# Patient Record
Sex: FEMALE | Race: WHITE
[De-identification: names, ages, dates, MRNs, and addresses within clinical notes are randomized per-mention and may not be internally consistent; named-entity substitution may affect disease eponyms.]

---
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
## 2019-07-17 ENCOUNTER — HOSPITAL ENCOUNTER (EMERGENCY)
Dept: HOSPITAL 25 - UCCORT | Age: 19
Discharge: HOME | End: 2019-07-17
Payer: COMMERCIAL

## 2019-07-17 VITALS — SYSTOLIC BLOOD PRESSURE: 99 MMHG | DIASTOLIC BLOOD PRESSURE: 65 MMHG

## 2019-07-17 DIAGNOSIS — J03.90: Primary | ICD-10-CM

## 2019-07-17 PROCEDURE — 99211 OFF/OP EST MAY X REQ PHY/QHP: CPT

## 2019-07-17 PROCEDURE — G0463 HOSPITAL OUTPT CLINIC VISIT: HCPCS

## 2019-07-17 PROCEDURE — 87651 STREP A DNA AMP PROBE: CPT

## 2019-07-17 NOTE — UC
General HPI





- HPI Summary


HPI Summary: 





diarrhea sunday x3, monday x2. once yesterday and none today.


past 2 days pt c/o a sore throat and white spots on R tonsil.


no fever, cough, sob, abdominal pain, n/v.





- History of Current Complaint


Chief Complaint: UCGeneralIllness


Stated Complaint: SORE THROAT/SPOTS


Time Seen by Provider: 07/17/19 18:00


Hx Obtained From: Patient


Hx Last Menstrual Period: 6/26/19


Pain Intensity: 0





- Allergy/Home Medications


Allergies/Adverse Reactions: 


 Allergies











Allergy/AdvReac Type Severity Reaction Status Date / Time


 


No Known Allergies Allergy   Verified 07/17/19 18:04











Home Medications: 


 Home Medications





Norgestimate-Ethinyl Estradiol [Tri-Linyah Tablet] 1 tab PO DAILY 07/17/19 [

History Confirmed 07/17/19]











PMH/Surg Hx/FS Hx/Imm Hx


Previously Healthy: Yes





- Surgical History


Surgical History: None





- Family History


Known Family History: Positive: Diabetes, Other - postivie h for eye 

irritation


   Negative: Cardiac Disease, Hypertension





- Social History


Lives: With Family


Alcohol Use: None


Substance Use Type: None


Smoking Status (MU): Never Smoked Tobacco


Have You Smoked in the Last Year: No





- Immunization History


Vaccination Up to Date: Yes





Review of Systems


All Other Systems Reviewed And Are Negative: Yes


Constitutional: Negative: Fever, Chills


ENT: Positive: Sore Throat.  Negative: Nasal Discharge, Sinus Congestion, Sinus 

Pain/Tenderness


Respiratory: Negative: Shortness Of Breath, Cough


Cardiovascular: Negative: Chest Pain


Gastrointestinal: Positive: Diarrhea - has resolved.  Negative: Abdominal Pain, 

Vomiting, Nausea





Physical Exam


Triage Information Reviewed: Yes


Appearance: Well-Appearing


Vital Signs: 


 Initial Vital Signs











Temp  98.6 F   07/17/19 17:56


 


Pulse  71   07/17/19 17:56


 


Resp  16   07/17/19 17:56


 


BP  99/65   07/17/19 17:56


 


Pulse Ox  100   07/17/19 17:56











Eye Exam: Normal


ENT: Positive: Pharyngeal erythema - AND MILD TONSILLAR ERYTHEMA, TMs normal, 

Uvula midline.  Negative: Nasal congestion, Nasal drainage, Tonsillar exudate, 

Trismus, Muffled voice, Hoarse voice


Neck: Positive: Supple, Nontender, Enlarged Nodes @ - PERITONSILAR


Respiratory: Positive: No respiratory distress


Abdomen Description: Positive: Nontender, No Organomegaly, Soft


Musculoskeletal: Positive: ROM Intact


Neurological: Positive: Alert


Psychological: Positive: Age Appropriate Behavior


Skin Exam: Normal


Skin: Negative: Rashes





Diagnostics





- Laboratory


Lab Results: 





RAPID STREP=NEGATIVE





Course/Dx





- Differential Dx - Multi-Symptom


Differential Diagnoses: Other - RAPID STREP=NEGATIVE. ANTIBIOTICS NOT INDICATED.





- Diagnoses


Provider Diagnosis: 


 Tonsillitis








Discharge





- Sign-Out/Discharge


Documenting (check all that apply): Patient Departure


All imaging exams completed and their final reports reviewed: No Studies





- Discharge Plan


Condition: Stable


Disposition: HOME


Patient Education Materials:  Tonsillitis (ED)


Referrals: 


Shailesh Shultz MD [Medical Doctor] - 


Additional Instructions: 


FOLLOW UP IF NOT BETTER IN5-7 DAYS OR SOONER IF WORSE.





- Billing Disposition and Condition


Condition: STABLE


Disposition: Home

## 2019-09-26 ENCOUNTER — HOSPITAL ENCOUNTER (EMERGENCY)
Dept: HOSPITAL 25 - UCCORT | Age: 19
Discharge: HOME | End: 2019-09-26
Payer: COMMERCIAL

## 2019-09-26 VITALS — SYSTOLIC BLOOD PRESSURE: 114 MMHG | DIASTOLIC BLOOD PRESSURE: 63 MMHG

## 2019-09-26 DIAGNOSIS — R51: Primary | ICD-10-CM

## 2019-09-26 DIAGNOSIS — R42: ICD-10-CM

## 2019-09-26 PROCEDURE — 99211 OFF/OP EST MAY X REQ PHY/QHP: CPT

## 2019-09-26 PROCEDURE — G0463 HOSPITAL OUTPT CLINIC VISIT: HCPCS
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
## 2019-10-17 ENCOUNTER — HOSPITAL ENCOUNTER (EMERGENCY)
Dept: HOSPITAL 25 - UCCORT | Age: 19
Discharge: HOME | End: 2019-10-17
Payer: COMMERCIAL

## 2019-10-17 VITALS — DIASTOLIC BLOOD PRESSURE: 71 MMHG | SYSTOLIC BLOOD PRESSURE: 110 MMHG

## 2019-10-17 DIAGNOSIS — N10: Primary | ICD-10-CM

## 2019-10-17 PROCEDURE — 84702 CHORIONIC GONADOTROPIN TEST: CPT

## 2019-10-17 PROCEDURE — 81003 URINALYSIS AUTO W/O SCOPE: CPT

## 2019-10-17 PROCEDURE — 87086 URINE CULTURE/COLONY COUNT: CPT

## 2019-10-17 PROCEDURE — 99212 OFFICE O/P EST SF 10 MIN: CPT

## 2019-10-17 PROCEDURE — G0463 HOSPITAL OUTPT CLINIC VISIT: HCPCS

## 2019-10-17 PROCEDURE — 87077 CULTURE AEROBIC IDENTIFY: CPT

## 2019-10-17 NOTE — ED
Abdominal Pain/Female





- HPI Summary


HPI Summary: 





19 yr old female with the complaint of right flank pain, and right upper 

quadrant pain.  No fever or chills. No NVD.  No loss of appetite.  Pain not 

affected by eating.  No vaginal bleeding or discharge.  Pain is 5/10.   





- History of Current Complaint


Chief Complaint: UCAbdominalPain


Stated Complaint: LOWER ABD PAIN


Time Seen by Provider: 10/17/19 09:02


Hx Last Menstrual Period: 9/30/19


Pain Intensity: 6


Allergies/Adverse Reactions: 


 Allergies











Allergy/AdvReac Type Severity Reaction Status Date / Time


 


No Known Allergies Allergy   Verified 10/17/19 08:57














PMH/Surg Hx/FS Hx/Imm Hx


Endocrine/Hematology History: 


   Denies: Hx Thyroid Disease


Respiratory History: 


   Denies: Hx Asthma


Infectious Disease History: No


Infectious Disease History: 


   Denies: History Other Infectious Disease, Traveled Outside the  in Last 30 

Days





- Family History


Known Family History: Positive: Diabetes, Other - postivie h for eye 

irritation


   Negative: Cardiac Disease, Hypertension





- Social History


Occupation: Employed Full-time


Alcohol Use: Occasionally


Substance Use Type: Reports: None


Smoking Status (MU): Never Smoked Tobacco


Have You Smoked in the Last Year: No





Review of Systems


Constitutional: Negative


Positive: flank pain


All Other Systems Reviewed And Are Negative: Yes





Physical Exam


Triage Information Reviewed: Yes


Vital Signs On Initial Exam: 


 Initial Vitals











Temp Pulse Resp BP Pulse Ox


 


 99.4 F   87   16   110/71   98 


 


 10/17/19 08:52  10/17/19 08:52  10/17/19 08:52  10/17/19 08:52  10/17/19 08:52











Vital Signs Reviewed: Yes


Appearance: Positive: Well-Appearing, No Pain Distress


Skin: Positive: Warm, Skin Color Reflects Adequate Perfusion


Head/Face: Positive: Normal Head/Face Inspection


Eyes: Positive: EOMI


ENT: Positive: Normal ENT inspection


Neck: Positive: Nontender


Respiratory/Lung Sounds: Positive: Clear to Auscultation, Breath Sounds Present


Cardiovascular: Positive: RRR.  Negative: Murmur


Abdomen Description: Positive: Nontender, CVA Tenderness (R).  Negative: 

Distended


Musculoskeletal: Positive: Strength/ROM Intact


Neurological: Positive: Sensory/Motor Intact, Alert, Oriented to Person Place, 

Time, CN Intact II-III, Normal Gait, Speech Normal


Psychiatric: Positive: Normal





Diagnostics





- Vital Signs


 Vital Signs











  Temp Pulse Resp BP Pulse Ox


 


 10/17/19 08:52  99.4 F  87  16  110/71  98














- Laboratory


Lab Results: 


 Lab Results











  10/17/19 Range/Units





  09:59 


 


POC Urine Color  Yellow  


 


POC Urine Clarity  Cloudy  


 


POC Urine pH  7.0  (5-9)  


 


POC Ur Specif Gravity  1.015  (1.010-1.030)  


 


POC Urine Protein  1+ A  (Negative)  


 


POC Ur Glucose (UA)  Negative  (Negative)  


 


POC Urine Ketones  Negative  (Negative)  


 


POC Urine Blood  3+ A  (Negative)  


 


POC Urine Nitrite  Negative  (Negative)  


 


POC Urine Bilirubin  Negative  (Negative)  


 


POC Urine Urobilinogen  0.2  (Negative)  


 


POC U Leukocyte Esteras  3+ A  (Negative)  











Lab Statement: Any lab studies that have been ordered have been reviewed, and 

results considered in the medical decision making process.





Abdominal Pain Fem Course/Dx





- Course


Course Of Treatment: 19 yr old with pyelonephritis.  Rx with bactrim.  DChome





- Diagnoses


Provider Diagnoses: 


 Pyelonephritis








Discharge ED





- Sign-Out/Discharge


Documenting (check all that apply): Patient Departure


All imaging exams completed and their final reports reviewed: No Studies





- Discharge Plan


Condition: Good


Disposition: HOME


Prescriptions: 


Sulfamethox/Trimethoprim DS* [Bactrim /160 TAB*] 1 tab PO BID #20 tab


Patient Education Materials:  Kidney Infection (ED), Flank Pain (ED)


Referrals: 


KATE León PA [Primary Care Provider] - 2 Days





- Billing Disposition and Condition


Condition: GOOD


Disposition: Home

## 2019-12-04 ENCOUNTER — HOSPITAL ENCOUNTER (EMERGENCY)
Dept: HOSPITAL 25 - UCCORT | Age: 19
Discharge: HOME | End: 2019-12-04
Payer: COMMERCIAL

## 2019-12-04 VITALS — DIASTOLIC BLOOD PRESSURE: 63 MMHG | SYSTOLIC BLOOD PRESSURE: 108 MMHG

## 2019-12-04 DIAGNOSIS — J03.90: Primary | ICD-10-CM

## 2019-12-04 PROCEDURE — 87070 CULTURE OTHR SPECIMN AEROBIC: CPT

## 2019-12-04 PROCEDURE — 99212 OFFICE O/P EST SF 10 MIN: CPT

## 2019-12-04 PROCEDURE — 87651 STREP A DNA AMP PROBE: CPT

## 2019-12-04 PROCEDURE — G0463 HOSPITAL OUTPT CLINIC VISIT: HCPCS

## 2019-12-04 PROCEDURE — 87077 CULTURE AEROBIC IDENTIFY: CPT

## 2019-12-04 NOTE — UC
Throat Pain/Nasal Mike HPI





- HPI Summary


HPI Summary: 





20 yo female with sore throat and fever/chills x 2 days


hurts to swallow








- History of Current Complaint


Chief Complaint: UCGeneralIllness


Stated Complaint: SORE THROAT


Time Seen by Provider: 12/04/19 10:59


Hx Obtained From: Patient


Hx Last Menstrual Period: 11/12/19


Onset/Duration: Gradual Onset, Lasting Days


Severity: Moderate


Pain Intensity: 5


Pain Scale Used: 0-10 Numeric


Cough: None


Associated Signs & Symptoms: Positive: Fever





- Epiglottits Risk Factors


Epiglottis Risk Factors: Negative





- Allergies/Home Medications


Allergies/Adverse Reactions: 


 Allergies











Allergy/AdvReac Type Severity Reaction Status Date / Time


 


No Known Allergies Allergy   Verified 12/04/19 10:54











Home Medications: 


 Home Medications





Escitalopram Oxalate [Lexapro 10 mg] 1 tab PO DAILY 12/04/19 [History Confirmed 

12/04/19]











PMH/Surg Hx/FS Hx/Imm Hx


Previously Healthy: Yes





- Surgical History


Surgical History: None





- Family History


Known Family History: Positive: Diabetes, Other - postivie FMh for eye 

irritation


   Negative: Cardiac Disease, Hypertension





- Social History


Alcohol Use: Occasionally


Substance Use Type: None


Smoking Status (MU): Never Smoked Tobacco


Have You Smoked in the Last Year: No





- Immunization History


Vaccination Up to Date: Yes





Review of Systems


All Other Systems Reviewed And Are Negative: Yes


Constitutional: Positive: Fever, Chills


Skin: Positive: Negative


Eyes: Positive: Negative


ENT: Positive: Sore Throat


Respiratory: Positive: Negative


Cardiovascular: Positive: Negative


Gastrointestinal: Positive: Negative


Genitourinary: Positive: Negative


Motor: Positive: Negative


Neurovascular: Positive: Negative


Musculoskeletal: Positive: Negative


Neurological: Positive: Negative


Psychological: Positive: Negative





Physical Exam


Triage Information Reviewed: Yes


Appearance: Well-Appearing, No Pain Distress, Well-Nourished


Vital Signs: 


 Initial Vital Signs











Temp  100 F   12/04/19 10:55


 


Pulse  113   12/04/19 10:55


 


Resp  16   12/04/19 10:55


 


BP  108/63   12/04/19 10:55


 


Pulse Ox  100   12/04/19 10:55











Vital Signs Reviewed: Yes


Eyes: Positive: Conjunctiva Clear


ENT: Positive: Hearing grossly normal, Tonsillar swelling, Tonsillar exudate, 

Uvula midline.  Negative: Nasal congestion, Nasal drainage, Trismus, Muffled 

voice, Hoarse voice


Neck: Positive: Supple, Nontender, Enlarged Nodes @ - ant cerv


Respiratory: Positive: Lungs clear, Normal breath sounds, No respiratory 

distress, No accessory muscle use


Cardiovascular: Positive: RRR, No Murmur


Musculoskeletal: Positive: ROM Intact, No Edema


Neurological: Positive: Alert


Psychological Exam: Normal


Skin Exam: Normal





Throat Pain/Nasal Course/Dx





- Differential Dx/Diagnosis


Provider Diagnosis: 


 Tonsillitis








Discharge ED





- Sign-Out/Discharge


Documenting (check all that apply): Patient Departure


All imaging exams completed and their final reports reviewed: No Studies





- Discharge Plan


Condition: Stable


Disposition: HOME


Prescriptions: 


Cephalexin CAP* [Keflex CAP*] 500 mg PO BID #20 cap


predniSONE [Prednisone 20 MG TAB] 60 mg PO DAILY #9 tab


predniSONE [Prednisone 20 MG TAB] 60 mg PO DAILY #9 tab


Patient Education Materials:  Tonsillitis (ED)


Forms:  *Work Release


Referrals: 


KATE León PA [Primary Care Provider] - If Needed





- Billing Disposition and Condition


Condition: STABLE


Disposition: Home

## 2019-12-06 NOTE — UC
- Progress Note


Progress Note: 





+ Group C strep 


On cephalexin


no change


ljj 12/6/19





Course/Dx





- Diagnoses


Provider Diagnoses: 


 Tonsillitis








Discharge ED





- Sign-Out/Discharge


Documenting (check all that apply): Post-Discharge Follow Up


All imaging exams completed and their final reports reviewed: No Studies





- Discharge Plan


Condition: Stable


Disposition: HOME


Prescriptions: 


Cephalexin CAP* [Keflex CAP*] 500 mg PO BID #20 cap


predniSONE [Prednisone 20 MG TAB] 60 mg PO DAILY #9 tab


predniSONE [Prednisone 20 MG TAB] 60 mg PO DAILY #9 tab


Patient Education Materials:  Tonsillitis (ED)


Forms:  *Work Release


Referrals: 


KATE León PA [Primary Care Provider] - If Needed





- Billing Disposition and Condition


Condition: STABLE


Disposition: Home

## 2020-01-16 ENCOUNTER — HOSPITAL ENCOUNTER (EMERGENCY)
Dept: HOSPITAL 25 - UCCORT | Age: 20
Discharge: HOME | End: 2020-01-16
Payer: COMMERCIAL

## 2020-01-16 VITALS — DIASTOLIC BLOOD PRESSURE: 76 MMHG | SYSTOLIC BLOOD PRESSURE: 123 MMHG

## 2020-01-16 DIAGNOSIS — R10.9: ICD-10-CM

## 2020-01-16 DIAGNOSIS — R19.7: Primary | ICD-10-CM

## 2020-01-16 PROCEDURE — 87328 CRYPTOSPORIDIUM AG IA: CPT

## 2020-01-16 PROCEDURE — 87046 STOOL CULTR AEROBIC BACT EA: CPT

## 2020-01-16 PROCEDURE — 87493 C DIFF AMPLIFIED PROBE: CPT

## 2020-01-16 PROCEDURE — G0463 HOSPITAL OUTPT CLINIC VISIT: HCPCS

## 2020-01-16 PROCEDURE — 87045 FECES CULTURE AEROBIC BACT: CPT

## 2020-01-16 PROCEDURE — 99211 OFF/OP EST MAY X REQ PHY/QHP: CPT

## 2020-01-16 PROCEDURE — 87329 GIARDIA AG IA: CPT

## 2020-01-16 PROCEDURE — 87899 AGENT NOS ASSAY W/OPTIC: CPT

## 2020-01-16 NOTE — UC
Abdominal Pain Female HPI





- HPI Summary


HPI Summary: 





20 yo female with `1 week hx of diarrhea


10+x/d


no n/v


? low grade temp


NO HA


no myalgias





was on keflex last month











- History of Current Complaint


Chief Complaint: UCGU


Stated Complaint: ABDOMINAL PAIN/DIARRHEA/ACHY


Time Seen by Provider: 01/16/20 18:02


Hx Obtained From: Patient


Hx Last Menstrual Period: 1/12/20


Onset/Duration: Gradual Onset, Lasting Days


Severity Initially: Moderate


Severity Currently: Moderate


Pain Intensity: 7 - when she has cramps/she is currently pain free


Pain Scale Used: 0-10 Numeric


Location: Diffuse


Character: Cramping


Aggravating Factor(s): Food


Alleviating Factor(s): Nothing


Associated Signs and Symptoms: Positive: Diarrhea.  Negative: Diaphoresis, Cough

, Chest Pain, Dizzy, Back Pain, Constipation, Blood in Stool, Urinary Symptoms, 

Decreased Appetite, Vaginal Bleeding, Vaginal Discharge, Nausea, Vomiting


Allergies/Adverse Reactions: 


 Allergies











Allergy/AdvReac Type Severity Reaction Status Date / Time


 


No Known Allergies Allergy   Verified 01/16/20 17:59














PMH/Surg Hx/FS Hx/Imm Hx


Previously Healthy: Yes





- Surgical History


Surgical History: None





- Family History


Known Family History: Positive: Diabetes, Other - postivie FMh for eye 

irritation


   Negative: Cardiac Disease, Hypertension





- Social History


Alcohol Use: Occasionally


Substance Use Type: None


Smoking Status (MU): Never Smoked Tobacco


Have You Smoked in the Last Year: No





- Immunization History


Vaccination Up to Date: Yes





Review of Systems


All Other Systems Reviewed And Are Negative: Yes


Constitutional: Positive: Negative


Skin: Positive: Negative


Eyes: Positive: Negative


ENT: Positive: Negative


Respiratory: Positive: Negative


Cardiovascular: Positive: Negative


Gastrointestinal: Positive: Abdominal Pain - intermittent/generalized/crampy, 

Diarrhea


Genitourinary: Positive: Negative


Motor: Positive: Negative


Neurovascular: Positive: Negative


Musculoskeletal: Positive: Negative


Neurological: Positive: Negative


Psychological: Positive: Negative





Physical Exam


Triage Information Reviewed: Yes


Appearance: Well-Appearing, No Pain Distress, Well-Nourished


Vital Signs: 


 Initial Vital Signs











Temp  99 F   01/16/20 17:49


 


Pulse  94   01/16/20 17:49


 


Resp  20   01/16/20 17:49


 


BP  123/76   01/16/20 17:49


 


Pulse Ox  98   01/16/20 17:49











Vital Signs Reviewed: Yes


Eyes: Positive: Conjunctiva Clear


ENT: Positive: Hearing grossly normal, Pharynx normal, Uvula midline.  Negative

: Nasal congestion, Nasal drainage, Trismus, Hoarse voice


Dental Exam: Normal


Neck: Positive: Supple, Nontender, No Lymphadenopathy


Respiratory: Positive: Lungs clear, Normal breath sounds, No respiratory 

distress, No accessory muscle use


Cardiovascular: Positive: RRR, No Murmur


Abdomen Description: Positive: Nontender, No Organomegaly, Soft.  Negative: CVA 

Tenderness (R), CVA Tenderness (L)


Bowel Sounds: Positive: Present


Musculoskeletal: Positive: ROM Intact, No Edema


Neurological: Positive: Alert


Psychological Exam: Normal


Skin Exam: Normal





Abd Pain Female Course/Dx





- Course


Course Of Treatment: 





unable to provide specimen here





- Differential Dx/Diagnosis


Provider Diagnosis: 


 Acute diarrhea








Discharge ED





- Sign-Out/Discharge


Documenting (check all that apply): Patient Departure


All imaging exams completed and their final reports reviewed: No Studies





- Discharge Plan


Condition: Stable


Disposition: HOME


Patient Education Materials:  Acute Diarrhea (ED), Nutrition Tips for Relief of 

Diarrhea (ED)


Referrals: 


KATE León PA [Primary Care Provider] - 4 Days


Additional Instructions: 


Bring in stool for studies





to ER for new or worsening symptoms











- Billing Disposition and Condition


Condition: STABLE


Disposition: Home

## 2020-01-19 NOTE — UC
- Progress Note


Progress Note: 


Stool studies reviewed


Patient is seen for diarrhea.


C. difficile PCR: Negative


Stool culture, Shiga toxin and ova and parasite: Pending


Await final culture studies


No change in plan








Course/Dx





- Diagnoses


Provider Diagnoses: 


 Acute diarrhea








Discharge ED





- Sign-Out/Discharge


Documenting (check all that apply): Post-Discharge Follow Up


All imaging exams completed and their final reports reviewed: No Studies





- Discharge Plan


Condition: Stable


Disposition: HOME


Patient Education Materials:  Acute Diarrhea (ED), Nutrition Tips for Relief of 

Diarrhea (ED)


Referrals: 


KATE León PA [Primary Care Provider] - 4 Days


Additional Instructions: 


Bring in stool for studies





to ER for new or worsening symptoms











- Billing Disposition and Condition


Condition: STABLE


Disposition: Home

## 2020-01-20 NOTE — UC
- Progress Note


Progress Note: 


Stool O and P from January 17, 2020 comes back as positive for Giardia negative 

for cryptosporidium.





Patient to call patient's and have them start Flagyl 500 mg by mouth twice a 

day for 7 days.





Follow-up with primary care physician.  Reevaluation sooner if worse or 

questions or concerns.





Course/Dx





- Diagnoses


Provider Diagnoses: 


 Acute diarrhea








Discharge ED





- Sign-Out/Discharge


Documenting (check all that apply): Patient Departure


All imaging exams completed and their final reports reviewed: No Studies





- Discharge Plan


Condition: Stable


Disposition: HOME


Prescriptions: 


metroNIDAZOLE [Flagyl 500 MG TAB] 500 mg PO BID #14 tab


Patient Education Materials:  Acute Diarrhea (ED), Nutrition Tips for Relief of 

Diarrhea (ED)


Referrals: 


KATE León PA [Primary Care Provider] - 4 Days


Additional Instructions: 


Bring in stool for studies





to ER for new or worsening symptoms











- Billing Disposition and Condition


Condition: STABLE


Disposition: Home

## 2020-04-03 ENCOUNTER — HOSPITAL ENCOUNTER (EMERGENCY)
Dept: HOSPITAL 25 - UCCORT | Age: 20
Discharge: HOME | End: 2020-04-03
Payer: COMMERCIAL

## 2020-04-03 VITALS — DIASTOLIC BLOOD PRESSURE: 62 MMHG | SYSTOLIC BLOOD PRESSURE: 114 MMHG

## 2020-04-03 DIAGNOSIS — K12.0: Primary | ICD-10-CM

## 2020-04-03 DIAGNOSIS — K08.89: ICD-10-CM

## 2020-04-03 DIAGNOSIS — Z79.899: ICD-10-CM

## 2020-04-03 DIAGNOSIS — F32.9: ICD-10-CM

## 2020-04-03 PROCEDURE — 99212 OFFICE O/P EST SF 10 MIN: CPT

## 2020-04-03 PROCEDURE — G0463 HOSPITAL OUTPT CLINIC VISIT: HCPCS

## 2020-04-03 NOTE — UC
Dental HPI





- HPI Summary


HPI Summary: 





19 year old female who complains of a left lower toothache since Monday of this 

week (5 days). She also has some scattered canker sores in her mouth. No fever 

or chills, no cold symptoms.





- History of Current Complaint


Chief Complaint: UCDentalProblem


Stated Complaint: DENTAL/ORAL


Time Seen by Provider: 04/03/20 10:19


Hx Obtained From: Patient


Hx Last Menstrual Period: 3/2020


Pregnant?: No


Onset/Duration: Gradual Onset, Lasting Days


Severity: Mild


Pain Intensity: 8


Aggravating Factor(s): Chewing


Alleviating Factor(s): Nothing





- Allergies/Home Medications


Allergies/Adverse Reactions: 


 Allergies











Allergy/AdvReac Type Severity Reaction Status Date / Time


 


No Known Allergies Allergy   Verified 04/03/20 10:28











Home Medications: 


 Home Medications





Norgestimate-Ethinyl Estradiol [Tri-Linyah Tablet] 1 tab PO DAILY 07/17/19 [

History Confirmed 04/03/20]


Escitalopram Oxalate [Lexapro 10 mg] 1 tab PO DAILY 12/04/19 [History Confirmed 

04/03/20]


Amoxicillin PO (*) [Amoxicillin 875 MG (*)] 875 mg PO BID 10 Days #20 tab 04/03/ 20 [Rx]


Ibuprofen TAB* [Motrin TAB* 800 MG] 800 mg PO ONCE 04/03/20 [History Confirmed 

04/03/20]











PMH/Surg Hx/FS Hx/Imm Hx


Previously Healthy: Yes


Psychological History: Depression





- Surgical History


Surgical History: None





- Family History


Known Family History: Positive: Diabetes, Other - postivie FMh for eye 

irritation


   Negative: Cardiac Disease, Hypertension





- Social History


Lives: With Family


Alcohol Use: Occasionally


Substance Use Type: None


Smoking Status (MU): Never Smoked Tobacco


Have You Smoked in the Last Year: No





- Immunization History


Vaccination Up to Date: Yes





Review of Systems


All Other Systems Reviewed And Are Negative: Yes


Skin: Positive: Other - Patient has some scattered canker sores around her 

buccal mucosa.


ENT: Positive: Dental Pain


Is Patient Immunocompromised?: No





Physical Exam


Triage Information Reviewed: Yes


Appearance: No Pain Distress, Well-Nourished


Vital Signs: 


 Initial Vital Signs











Temp  99.1 F   04/03/20 10:26


 


Pulse  83   04/03/20 10:26


 


Resp  15   04/03/20 10:26


 


BP  114/62   04/03/20 10:26


 


Pulse Ox  100   04/03/20 10:26











Vital Signs Reviewed: Yes


Eyes: Positive: Conjunctiva Clear


ENT: Positive: TMs normal, Uvula midline, Other - Patient has some scattered 

small reddish papules which appear to be aphthous ulcers on her posterior 

pharynx and her tongue.


Dental: Positive: Percussion Tenderness @ - Tooth #20.  The gumline itself is 

not excessively erythematous or swollen.  I don't see any dental abscess and 

the tooth is intact.


Neck: Positive: Supple, Nontender, No Lymphadenopathy


Respiratory: Positive: Lungs clear, Normal breath sounds, No respiratory 

distress, No accessory muscle use


Cardiovascular: Positive: RRR, No Murmur, Pulses Normal, Brisk Capillary Refill


Musculoskeletal Exam: Normal


Neurological Exam: Normal


Psychological Exam: Normal


Skin: Positive: Other - See above notes





Dental Complaint Course/Dx





- Course


Course Of Treatment: 





Patient is comfortable here.  I am going to treat her with an antibiotic 

because she stated her left lower jaw was swollen this morning although I do 

not see that here.  She is also to rinse her mouth with warm salt water to 

treat the aphthous ulcers.  If she needs further follow-up with a dentist she 

is to call her own dentist and talk with him.





- Differential Dx/Diagnosis


Provider Diagnosis: 


 Aphthous ulcer of mouth, Toothache








Discharge ED





- Sign-Out/Discharge


Documenting (check all that apply): Patient Departure


All imaging exams completed and their final reports reviewed: No Studies





- Discharge Plan


Condition: Good


Disposition: HOME


Prescriptions: 


Amoxicillin PO (*) [Amoxicillin 875 MG (*)] 875 mg PO BID 10 Days #20 tab


Patient Education Materials:  Canker Sores (ED), Toothache (ED)


Referrals: 


KATE León PA [Primary Care Provider] - 


Additional Instructions: 


May take Tylenol every 4 hours and alternate with Motrin every 8 hours for 

pain.  Warm saltwater gargles 4-6 times a day.  Follow-up with your dentist as 

needed.





- Billing Disposition and Condition


Condition: GOOD


Disposition: Home





- Attestation Statements


Provider Attestation: 





This patient was not seen by me.


I was available for consult.


Chart reviewed.


RAFAEL